# Patient Record
Sex: MALE | Race: WHITE | NOT HISPANIC OR LATINO | Employment: FULL TIME | URBAN - METROPOLITAN AREA
[De-identification: names, ages, dates, MRNs, and addresses within clinical notes are randomized per-mention and may not be internally consistent; named-entity substitution may affect disease eponyms.]

---
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
## 2020-01-06 RX ORDER — PEN NEEDLE, DIABETIC 32GX 5/32"
NEEDLE, DISPOSABLE MISCELLANEOUS
Qty: 100 EACH | Refills: 2 | Status: SHIPPED | OUTPATIENT
Start: 2020-01-06 | End: 2020-04-13

## 2020-01-17 ENCOUNTER — OFFICE VISIT (OUTPATIENT)
Dept: FAMILY MEDICINE CLINIC | Facility: CLINIC | Age: 20
End: 2020-01-17

## 2020-01-17 VITALS
OXYGEN SATURATION: 97 % | HEART RATE: 88 BPM | SYSTOLIC BLOOD PRESSURE: 102 MMHG | DIASTOLIC BLOOD PRESSURE: 66 MMHG | WEIGHT: 148 LBS | BODY MASS INDEX: 20.72 KG/M2 | HEIGHT: 71 IN

## 2020-01-17 DIAGNOSIS — E10.9 TYPE 1 DIABETES MELLITUS WITHOUT COMPLICATION (HCC): Primary | ICD-10-CM

## 2020-01-17 PROCEDURE — 99213 OFFICE O/P EST LOW 20 MIN: CPT | Performed by: FAMILY MEDICINE

## 2020-01-17 NOTE — PATIENT INSTRUCTIONS
Patient will be following with endocrinology for now.  When he is released from endocrinology I can manage his diabetes in the future.  I advised him to follow-up for routine health maintenance as scheduled.

## 2020-01-17 NOTE — PROGRESS NOTES
Subjective   Philippe Hartmann is a 19 y.o. male.     Chief Complaint   Patient presents with   • Diabetes     4 week follow up. States he saw ENDO in December and will see them again within the next month       Patient is here to follow-up on the new diagnosis of type 1 diabetes. He has been compliant with using the Toujeo 10 units at bedtime.  Patient averages about 1 to 2 units of NovoLog with meals.  He usually runs around 130 for blood sugar.  Overall the patient states that he is feeling well.  The patient will follow-up with [facility] endocrinology in the next few weeks. pt has not had any low sugar readings.        Review of Systems   Constitutional: Negative for activity change, chills, fatigue and fever.   HENT: Negative for hearing loss, swollen glands, tinnitus and trouble swallowing.    Eyes: Negative for pain and visual disturbance.   Respiratory: Negative for cough and shortness of breath.    Cardiovascular: Negative for chest pain, palpitations and leg swelling.   Gastrointestinal: Negative for diarrhea and nausea.   Endocrine: Negative for polydipsia and polyuria.   Genitourinary: Negative for difficulty urinating and urinary incontinence.   Musculoskeletal: Negative for arthralgias, gait problem and joint swelling.   Skin: Negative for rash.   Allergic/Immunologic: Negative for immunocompromised state.   Neurological: Negative for dizziness, light-headedness and headache.   Hematological: Negative for adenopathy. Does not bruise/bleed easily.   Psychiatric/Behavioral: Negative for dysphoric mood and sleep disturbance.       The following portions of the patient's history were reviewed and updated as appropriate: allergies, current medications, past family history, past medical history, past social history, past surgical history and problem list.    Past Medical History:   Diagnosis Date   • Acute pharyngitis    • Acute upper respiratory infection    • Allergic rhinitis    • Atopic dermatitis    • Diabetes  mellitus (CMS/HCC)    • Fever    • Impingement syndrome of left shoulder    • Shoulder disorder    • Sore throat    • Sore throat    • Viral bronchitis        Past Surgical History:   Procedure Laterality Date   • CYST REMOVAL     • CYST REMOVAL      lower left leg       Family History   Problem Relation Age of Onset   • No Known Problems Mother    • No Known Problems Father        Social History     Socioeconomic History   • Marital status: Single     Spouse name: Not on file   • Number of children: Not on file   • Years of education: Not on file   • Highest education level: High school graduate   Occupational History   • Occupation:      Employer: [employer]   Tobacco Use   • Smoking status: Never Smoker   • Smokeless tobacco: Never Used   Substance and Sexual Activity   • Alcohol use: Yes     Frequency: Monthly or less   • Drug use: Yes     Frequency: 0.5 times per week     Types: Marijuana   • Sexual activity: Never     Partners: Female         Current Outpatient Medications:   •  BD PEN NEEDLE KHADIJAH U/F 32G X 4 MM misc, USE FOUR TIMES DAILY, Disp: 100 each, Rfl: 2  •  Blood Glucose Monitoring Suppl (BLOOD GLUCOSE MONITOR SYSTEM) w/Device kit, Use to test blood sugar three times daily, Disp: 1 each, Rfl: 0  •  glucose blood test strip, Use to test blood sugar three times daily, Disp: 90 each, Rfl: 12  •  insulin aspart (NOVOLOG FLEXPEN) 100 UNIT/ML solution pen-injector sc pen, Inject 5-10 Units under the skin into the appropriate area as directed 3 (Three) Times a Day With Meals., Disp: 3 mL, Rfl: 1  •  Insulin Glargine, 2 Unit Dial, (TOUJEO MAX SOLOSTAR) 300 UNIT/ML solution pen-injector injection, Inject 10 units subcutaneously at bedtime, Disp: 3 mL, Rfl: 1  •  Lancets (FREESTYLE) lancets, Use to test blood sugar three times daily, Disp: 90 each, Rfl: 12  •  promethazine (PHENERGAN) 12.5 MG tablet, 1/2  To  1 tab every 4-6 hours as needed for nausea or vomiting, Disp: 30 tablet, Rfl:  "0    Objective     Vitals:    01/17/20 1406   BP: 102/66   Pulse: 88   SpO2: 97%   Weight: 67.1 kg (148 lb)   Height: 180.3 cm (71\")       Body mass index is 20.64 kg/m².    No components found for: 2D    Physical Exam   Constitutional: He is oriented to person, place, and time. He appears well-developed and well-nourished.   HENT:   Head: Normocephalic and atraumatic.   Eyes: Conjunctivae are normal.   Neck: Normal range of motion. Neck supple.   Cardiovascular: Normal rate, regular rhythm, normal heart sounds and intact distal pulses.   Pulmonary/Chest: Effort normal and breath sounds normal.   Abdominal: Soft. Bowel sounds are normal.   Musculoskeletal: Normal range of motion. He exhibits no edema.   Neurological: He is alert and oriented to person, place, and time.   Skin: Skin is warm and dry. Capillary refill takes less than 2 seconds. No rash noted.   Psychiatric: He has a normal mood and affect. His behavior is normal. Judgment and thought content normal.   Nursing note and vitals reviewed.      Procedures    Assessment/Plan   Philippe was seen today for diabetes.    Diagnoses and all orders for this visit:    Type 1 diabetes mellitus without complication (CMS/[facility])  -     insulin aspart (NOVOLOG FLEXPEN) 100 UNIT/ML solution pen-injector sc pen; Inject 5-10 Units under the skin into the appropriate area as directed 3 (Three) Times a Day With Meals.  -     Insulin Glargine, 2 Unit Dial, (TOUJEO MAX SOLOSTAR) 300 UNIT/ML solution pen-injector injection; Inject 10 units subcutaneously at bedtime        Patient Instructions   Patient will be following with endocrinology for now.  When he is released from endocrinology I can manage his diabetes in the future.  I advised him to follow-up for routine health maintenance as scheduled.           "

## 2020-04-12 DIAGNOSIS — E10.9 TYPE 1 DIABETES MELLITUS WITHOUT COMPLICATION (HCC): ICD-10-CM

## 2020-04-13 RX ORDER — INSULIN GLARGINE 300 U/ML
INJECTION, SOLUTION SUBCUTANEOUS
Qty: 3 ML | Refills: 1 | Status: SHIPPED | OUTPATIENT
Start: 2020-04-13 | End: 2022-09-09

## 2020-04-13 RX ORDER — PEN NEEDLE, DIABETIC 32GX 5/32"
NEEDLE, DISPOSABLE MISCELLANEOUS
Qty: 100 EACH | Refills: 2 | Status: SHIPPED | OUTPATIENT
Start: 2020-04-13 | End: 2020-08-12 | Stop reason: SDUPTHER

## 2020-04-13 NOTE — TELEPHONE ENCOUNTER
REFILL FOR DM MEDS BUT I BELIEVE PT HAS HAD ATLEAST ONE APPT WITH ENDO BEFORE THE PANDEMIC OCCURRED. DO YOU WANT TO REFUSE?

## 2020-08-12 RX ORDER — PEN NEEDLE, DIABETIC 32GX 5/32"
NEEDLE, DISPOSABLE MISCELLANEOUS
Qty: 200 EACH | Refills: 2 | Status: SHIPPED | OUTPATIENT
Start: 2020-08-12 | End: 2020-12-24 | Stop reason: SDUPTHER

## 2020-09-22 DIAGNOSIS — E10.9 TYPE 1 DIABETES MELLITUS WITHOUT COMPLICATION (HCC): ICD-10-CM

## 2020-09-22 RX ORDER — INSULIN ASPART 100 [IU]/ML
5-10 INJECTION, SOLUTION INTRAVENOUS; SUBCUTANEOUS
Qty: 3 ML | Refills: 1 | Status: SHIPPED | OUTPATIENT
Start: 2020-09-22 | End: 2022-12-27 | Stop reason: SDUPTHER

## 2020-10-29 ENCOUNTER — OFFICE VISIT (OUTPATIENT)
Dept: FAMILY MEDICINE CLINIC | Facility: CLINIC | Age: 20
End: 2020-10-29

## 2020-10-29 VITALS
SYSTOLIC BLOOD PRESSURE: 98 MMHG | OXYGEN SATURATION: 97 % | DIASTOLIC BLOOD PRESSURE: 64 MMHG | HEIGHT: 71 IN | TEMPERATURE: 97.7 F | BODY MASS INDEX: 20.72 KG/M2 | HEART RATE: 64 BPM | WEIGHT: 148 LBS

## 2020-10-29 DIAGNOSIS — E10.9 TYPE 1 DIABETES MELLITUS WITHOUT COMPLICATION (HCC): ICD-10-CM

## 2020-10-29 DIAGNOSIS — H65.01 NON-RECURRENT ACUTE SEROUS OTITIS MEDIA OF RIGHT EAR: Primary | ICD-10-CM

## 2020-10-29 LAB — HBA1C MFR BLD: 5.7 %

## 2020-10-29 PROCEDURE — 99213 OFFICE O/P EST LOW 20 MIN: CPT | Performed by: FAMILY MEDICINE

## 2020-10-29 PROCEDURE — 83036 HEMOGLOBIN GLYCOSYLATED A1C: CPT | Performed by: FAMILY MEDICINE

## 2020-10-29 NOTE — PROGRESS NOTES
Subjective   Philippe Hartmann is a 20 y.o. male.     Chief Complaint   Patient presents with   • Earache     right ear       Patient is here today with a new problem.  He started a little over a week ago with right ear pain and decreased hearing.  He denies any discharge from the ear.  He denies any injury or trauma.  Patient has had some allergy symptoms but nothing out of the normal.  He denies any fever or chills, sore throat or cough.  He states that slowly over the last week the pain has gone away and his hearing has improved.    Type 1 diabetes.  The patient states that he has not seen his endocrinologist for almost a year now.  He states that his blood sugars are well controlled on his current regimen.  He denies any significant lows.       Review of Systems   Constitutional: Negative for activity change, chills, fatigue and fever.   HENT: Positive for ear pain and hearing loss. Negative for swollen glands, tinnitus and trouble swallowing.    Eyes: Negative for pain and visual disturbance.   Respiratory: Negative for cough and shortness of breath.    Cardiovascular: Negative for chest pain, palpitations and leg swelling.   Gastrointestinal: Negative for diarrhea and nausea.   Endocrine: Negative for polydipsia and polyuria.   Genitourinary: Negative for difficulty urinating and urinary incontinence.   Musculoskeletal: Negative for arthralgias, gait problem and joint swelling.   Skin: Negative for rash.   Allergic/Immunologic: Negative for immunocompromised state.   Neurological: Negative for dizziness, light-headedness and headache.   Hematological: Negative for adenopathy. Does not bruise/bleed easily.   Psychiatric/Behavioral: Negative for dysphoric mood and sleep disturbance.       The following portions of the patient's history were reviewed and updated as appropriate: allergies, current medications, past family history, past medical history, past social history, past surgical history and problem  list.    Past Medical History:   Diagnosis Date   • Acute pharyngitis    • Acute upper respiratory infection    • Allergic rhinitis    • Atopic dermatitis    • Diabetes mellitus (CMS/HCC)    • Fever    • Impingement syndrome of left shoulder    • Shoulder disorder    • Sore throat    • Sore throat    • Viral bronchitis        Past Surgical History:   Procedure Laterality Date   • CYST REMOVAL     • CYST REMOVAL      lower left leg       Family History   Problem Relation Age of Onset   • No Known Problems Mother    • No Known Problems Father        Social History     Socioeconomic History   • Marital status: Single     Spouse name: Not on file   • Number of children: Not on file   • Years of education: Not on file   • Highest education level: High school graduate   Occupational History   • Occupation:      Employer: [employer]   Tobacco Use   • Smoking status: Never Smoker   • Smokeless tobacco: Never Used   Substance and Sexual Activity   • Alcohol use: Yes     Frequency: Monthly or less   • Drug use: Yes     Frequency: 0.5 times per week     Types: Marijuana   • Sexual activity: Never     Partners: Female         Current Outpatient Medications:   •  BD PEN NEEDLE KHADIJAH U/F 32G X 4 MM misc, USE FOUR TIMES DAILY, Disp: 200 each, Rfl: 2  •  Blood Glucose Monitoring Suppl (BLOOD GLUCOSE MONITOR SYSTEM) w/Device kit, Use to test blood sugar three times daily, Disp: 1 each, Rfl: 0  •  glucose blood test strip, Use to test blood sugar three times daily, Disp: 90 each, Rfl: 12  •  insulin aspart (NovoLOG FlexPen) 100 UNIT/ML solution pen-injector sc pen, Inject 5-10 Units under the skin into the appropriate area as directed 3 (Three) Times a Day With Meals., Disp: 3 mL, Rfl: 1  •  Insulin Pen Needle (BD Pen Needle Khadijah U/F) 32G X 4 MM misc, USE TO INJECT INSULIN FOUR TIMES DAILY/, Disp: 100 each, Rfl: 4  •  Lancets (FREESTYLE) lancets, Use to test blood sugar three times daily, Disp: 90 each, Rfl: 12  •   "TOUJEO MAX SOLOSTAR 300 UNIT/ML solution pen-injector injection, INJECT 10 UNITS UNDER THE SKIN EVERY NIGHT AT BEDTIME, Disp: 3 mL, Rfl: 1    Objective     Vitals:    10/29/20 1448   BP: 98/64   Pulse: 64   Temp: 97.7 °F (36.5 °C)   SpO2: 97%   Weight: 67.1 kg (148 lb)   Height: 180.3 cm (71\")       Body mass index is 20.64 kg/m².    No components found for: 2D    Physical Exam  Vitals signs and nursing note reviewed.   Constitutional:       Appearance: He is well-developed.   HENT:      Head: Normocephalic and atraumatic.      Right Ear: External ear normal.      Left Ear: Tympanic membrane, ear canal and external ear normal.      Ears:      Comments: Right TM was dull with some clear fluid middle ear.  Eyes:      Conjunctiva/sclera: Conjunctivae normal.   Neck:      Musculoskeletal: Normal range of motion and neck supple.   Cardiovascular:      Rate and Rhythm: Normal rate and regular rhythm.      Heart sounds: Normal heart sounds.   Pulmonary:      Effort: Pulmonary effort is normal.      Breath sounds: Normal breath sounds.   Abdominal:      General: Bowel sounds are normal.      Palpations: Abdomen is soft.   Musculoskeletal: Normal range of motion.      Right lower leg: No edema.      Left lower leg: No edema.   Skin:     General: Skin is warm and dry.      Capillary Refill: Capillary refill takes less than 2 seconds.      Findings: No rash.   Neurological:      General: No focal deficit present.      Mental Status: He is alert and oriented to person, place, and time.   Psychiatric:         Mood and Affect: Mood normal.         Behavior: Behavior normal.         Thought Content: Thought content normal.         Judgment: Judgment normal.         Procedures    Assessment/Plan   Diagnoses and all orders for this visit:    1. Non-recurrent acute serous otitis media of right ear (Primary)    2. Type 1 diabetes mellitus without complication (CMS/[facility])  -     POC Glycosylated Hemoglobin (Hb A1C)    Patient was advised " to use Claritin and Flonase nasal spray.  If his ear symptoms worsen or do not fully resolve he should return to the office.  Patient was advised to follow-up with his endocrinologist.    Patient Instructions   I recommended Claritin 10 mg daily and Flonase nasal spray for allergies.

## 2020-11-04 ENCOUNTER — TELEMEDICINE (OUTPATIENT)
Dept: FAMILY MEDICINE CLINIC | Facility: CLINIC | Age: 20
End: 2020-11-04

## 2020-11-04 DIAGNOSIS — J06.9 URI, ACUTE: ICD-10-CM

## 2020-11-04 DIAGNOSIS — R05.9 COUGH: Primary | ICD-10-CM

## 2020-11-04 PROCEDURE — 99213 OFFICE O/P EST LOW 20 MIN: CPT | Performed by: FAMILY MEDICINE

## 2020-11-04 RX ORDER — DEXTROMETHORPHAN HYDROBROMIDE AND PROMETHAZINE HYDROCHLORIDE 15; 6.25 MG/5ML; MG/5ML
5 SYRUP ORAL 4 TIMES DAILY PRN
Qty: 118 ML | Refills: 0 | Status: SHIPPED | OUTPATIENT
Start: 2020-11-04 | End: 2022-05-19 | Stop reason: SDUPTHER

## 2020-11-04 RX ORDER — INSULIN ADMIN. SUPPLIES
INSULIN PEN (EA) SUBCUTANEOUS
COMMUNITY
Start: 2020-08-25

## 2020-11-04 NOTE — PROGRESS NOTES
You have chosen to receive care through a telephone visit. Do you consent to use a telephone visit for your medical care today? YES     Subjective   Philippe Hartmann is a 20 y.o. male.     Chief Complaint   Patient presents with   • Sore Throat   • Headache   • Cough   • Fatigue   • URI   • Nasal Congestion   • Chills       Patient requested a video visit today during the coronavirus pandemic to discuss some new symptoms he is having.  He states that 3 days ago he started with a sore throat but then last night he suddenly became chilled, started having a runny nose, congestion, cough, nausea and a severe headache.  The headache is improved somewhat with antipyretics.  He does not have a thermometer so is unable to take his temperature.  However, he is feeling very fatigued as well.  So far his blood sugars have been okay.  The patient states 4 days ago he was out all day and was around friends who were not social distancing or wearing masks.  Nobody was exhibiting any symptoms at that time and he does not have any known COVID-19 contacts.  Currently the patient is living with 3 roommates but he does have his own bedroom.       Review of Systems   Constitutional: Positive for chills and fatigue. Negative for activity change and fever.   HENT: Positive for congestion, rhinorrhea and sore throat. Negative for hearing loss, swollen glands, tinnitus and trouble swallowing.    Eyes: Negative for pain and visual disturbance.   Respiratory: Positive for cough. Negative for shortness of breath.    Cardiovascular: Negative for chest pain, palpitations and leg swelling.   Gastrointestinal: Positive for nausea. Negative for diarrhea.   Endocrine: Negative for polydipsia and polyuria.   Genitourinary: Negative for difficulty urinating and urinary incontinence.   Musculoskeletal: Negative for arthralgias, gait problem and joint swelling.   Skin: Negative for rash.   Allergic/Immunologic: Negative for immunocompromised state.    Neurological: Positive for headache. Negative for dizziness and light-headedness.   Hematological: Negative for adenopathy. Does not bruise/bleed easily.   Psychiatric/Behavioral: Negative for dysphoric mood and sleep disturbance.       The following portions of the patient's history were reviewed and updated as appropriate: allergies, current medications, past family history, past medical history, past social history, past surgical history and problem list.    Past Medical History:   Diagnosis Date   • Acute pharyngitis    • Acute upper respiratory infection    • Allergic rhinitis    • Atopic dermatitis    • Diabetes mellitus (CMS/HCC)    • Fever    • Impingement syndrome of left shoulder    • Shoulder disorder    • Sore throat    • Sore throat    • Viral bronchitis        Past Surgical History:   Procedure Laterality Date   • CYST REMOVAL     • CYST REMOVAL      lower left leg       Family History   Problem Relation Age of Onset   • No Known Problems Mother    • No Known Problems Father        Social History     Socioeconomic History   • Marital status: Single     Spouse name: Not on file   • Number of children: Not on file   • Years of education: Not on file   • Highest education level: High school graduate   Occupational History   • Occupation:      Employer: [employer]   Tobacco Use   • Smoking status: Never Smoker   • Smokeless tobacco: Never Used   Substance and Sexual Activity   • Alcohol use: Yes     Frequency: Monthly or less   • Drug use: Yes     Frequency: 0.5 times per week     Types: Marijuana   • Sexual activity: Never     Partners: Female         Current Outpatient Medications:   •  BD PEN NEEDLE KHADIJAH U/F 32G X 4 MM misc, USE FOUR TIMES DAILY, Disp: 200 each, Rfl: 2  •  Blood Glucose Monitoring Suppl (BLOOD GLUCOSE MONITOR SYSTEM) w/Device kit, Use to test blood sugar three times daily, Disp: 1 each, Rfl: 0  •  glucose blood test strip, Use to test blood sugar three times daily, Disp:  90 each, Rfl: 12  •  insulin aspart (NovoLOG FlexPen) 100 UNIT/ML solution pen-injector sc pen, Inject 5-10 Units under the skin into the appropriate area as directed 3 (Three) Times a Day With Meals., Disp: 3 mL, Rfl: 1  •  Insulin Pen Needle (BD Pen Needle Martha U/F) 32G X 4 MM misc, USE TO INJECT INSULIN FOUR TIMES DAILY/, Disp: 100 each, Rfl: 4  •  Lancets (FREESTYLE) lancets, Use to test blood sugar three times daily, Disp: 90 each, Rfl: 12  •  NovoPen Echo device, , Disp: , Rfl:   •  TOUJEO MAX SOLOSTAR 300 UNIT/ML solution pen-injector injection, INJECT 10 UNITS UNDER THE SKIN EVERY NIGHT AT BEDTIME, Disp: 3 mL, Rfl: 1  •  promethazine-dextromethorphan (PROMETHAZINE-DM) 6.25-15 MG/5ML syrup, Take 5 mL by mouth 4 (Four) Times a Day As Needed for Cough., Disp: 118 mL, Rfl: 0    Objective     There were no vitals filed for this visit.    There is no height or weight on file to calculate BMI.    No components found for: 2D    Physical Exam  Constitutional:       Appearance: Normal appearance. He is well-developed.   HENT:      Head: Normocephalic and atraumatic.   Eyes:      General: No scleral icterus.     Conjunctiva/sclera: Conjunctivae normal.   Pulmonary:      Effort: Pulmonary effort is normal.   Neurological:      General: No focal deficit present.      Mental Status: He is alert and oriented to person, place, and time.   Psychiatric:         Mood and Affect: Mood normal.         Behavior: Behavior normal.         Thought Content: Thought content normal.         Judgment: Judgment normal.         Procedures    Assessment/Plan   Diagnoses and all orders for this visit:    1. Cough (Primary)  -     COVID-19,[facility] ROUTINE, NP/OP SWAB IN TRANSPORT MEDIA OR ESWAB 72 HR TAT - Swab, Oropharynx; Future  -     promethazine-dextromethorphan (PROMETHAZINE-DM) 6.25-15 MG/5ML syrup; Take 5 mL by mouth 4 (Four) Times a Day As Needed for Cough.  Dispense: 118 mL; Refill: 0  -     QUESTIONNAIRE SERIES    2. URI, acute  -      COVID-19,[facility] ROUTINE, NP/OP SWAB IN TRANSPORT MEDIA OR ESWAB 72 HR TAT - Swab, Oropharynx; Future  -     QUESTIONNAIRE SERIES    The patient was advised to self quarantine and follow the guidelines of social distancing, wearing a mask, and good hand hygiene.  He was advised to closely monitor his symptoms and his blood sugars and report back to me if he is feeling significantly worse or his blood sugars are consistently greater than 400.  Continue to hydrate well with water, rest, and follow good nutrition.    This was an audio and video enabled telemedicine encounter lasting 15 minutes, all of which was spent in direct audio and video contact with the patient.  There are no Patient Instructions on file for this visit.

## 2020-11-05 DIAGNOSIS — E10.9 TYPE 1 DIABETES MELLITUS WITHOUT COMPLICATION (HCC): ICD-10-CM

## 2020-11-05 RX ORDER — BLOOD-GLUCOSE METER
EACH MISCELLANEOUS
Qty: 1 KIT | Refills: 0 | Status: SHIPPED | OUTPATIENT
Start: 2020-11-05

## 2020-12-05 DIAGNOSIS — E10.9 TYPE 1 DIABETES MELLITUS WITHOUT COMPLICATION (HCC): ICD-10-CM

## 2020-12-07 RX ORDER — BLOOD SUGAR DIAGNOSTIC
STRIP MISCELLANEOUS
Qty: 100 EACH | Refills: 12 | Status: SHIPPED | OUTPATIENT
Start: 2020-12-07

## 2020-12-27 RX ORDER — PEN NEEDLE, DIABETIC 32GX 5/32"
NEEDLE, DISPOSABLE MISCELLANEOUS
Qty: 200 EACH | Refills: 2 | Status: SHIPPED | OUTPATIENT
Start: 2020-12-27

## 2021-04-16 ENCOUNTER — BULK ORDERING (OUTPATIENT)
Dept: CASE MANAGEMENT | Facility: OTHER | Age: 21
End: 2021-04-16

## 2021-04-16 DIAGNOSIS — Z23 IMMUNIZATION DUE: ICD-10-CM

## 2021-08-23 RX ORDER — PEN NEEDLE, DIABETIC 32GX 5/32"
NEEDLE, DISPOSABLE MISCELLANEOUS
Qty: 200 EACH | Refills: 2 | OUTPATIENT
Start: 2021-08-23

## 2021-08-23 RX ORDER — PEN NEEDLE, DIABETIC 32GX 5/32"
NEEDLE, DISPOSABLE MISCELLANEOUS
Qty: 200 EACH | OUTPATIENT
Start: 2021-08-23

## 2021-10-11 RX ORDER — PEN NEEDLE, DIABETIC 32GX 5/32"
NEEDLE, DISPOSABLE MISCELLANEOUS
Qty: 200 EACH | Refills: 0 | Status: SHIPPED | OUTPATIENT
Start: 2021-10-11 | End: 2021-12-30 | Stop reason: SDUPTHER

## 2021-12-30 RX ORDER — PEN NEEDLE, DIABETIC 32GX 5/32"
NEEDLE, DISPOSABLE MISCELLANEOUS
Qty: 200 EACH | Refills: 0 | Status: SHIPPED | OUTPATIENT
Start: 2021-12-30 | End: 2022-03-14

## 2022-03-14 RX ORDER — PEN NEEDLE, DIABETIC 32GX 5/32"
NEEDLE, DISPOSABLE MISCELLANEOUS
Qty: 200 EACH | Refills: 0 | Status: SHIPPED | OUTPATIENT
Start: 2022-03-14 | End: 2022-04-06

## 2022-04-06 RX ORDER — PEN NEEDLE, DIABETIC 32GX 5/32"
NEEDLE, DISPOSABLE MISCELLANEOUS
Qty: 200 EACH | Refills: 0 | Status: SHIPPED | OUTPATIENT
Start: 2022-04-06 | End: 2022-04-27

## 2022-04-27 RX ORDER — PEN NEEDLE, DIABETIC 32GX 5/32"
NEEDLE, DISPOSABLE MISCELLANEOUS
Qty: 200 EACH | Refills: 0 | Status: SHIPPED | OUTPATIENT
Start: 2022-04-27

## 2022-05-19 ENCOUNTER — OFFICE VISIT (OUTPATIENT)
Dept: FAMILY MEDICINE CLINIC | Facility: CLINIC | Age: 22
End: 2022-05-19

## 2022-05-19 VITALS
OXYGEN SATURATION: 98 % | HEART RATE: 90 BPM | SYSTOLIC BLOOD PRESSURE: 110 MMHG | HEIGHT: 71 IN | DIASTOLIC BLOOD PRESSURE: 70 MMHG | BODY MASS INDEX: 23.24 KG/M2 | WEIGHT: 166 LBS | TEMPERATURE: 98.9 F

## 2022-05-19 DIAGNOSIS — U07.1 UPPER RESPIRATORY TRACT INFECTION DUE TO COVID-19 VIRUS: Primary | ICD-10-CM

## 2022-05-19 DIAGNOSIS — E10.9 TYPE 1 DIABETES MELLITUS WITHOUT COMPLICATION: ICD-10-CM

## 2022-05-19 DIAGNOSIS — J06.9 UPPER RESPIRATORY TRACT INFECTION DUE TO COVID-19 VIRUS: Primary | ICD-10-CM

## 2022-05-19 DIAGNOSIS — R52 BODY ACHES: ICD-10-CM

## 2022-05-19 DIAGNOSIS — R05.9 COUGH: ICD-10-CM

## 2022-05-19 LAB
EXPIRATION DATE: ABNORMAL
EXPIRATION DATE: NORMAL
FLUAV AG NPH QL: NEGATIVE
FLUBV AG NPH QL: NEGATIVE
INTERNAL CONTROL: ABNORMAL
INTERNAL CONTROL: NORMAL
Lab: ABNORMAL
Lab: NORMAL
SARS-COV-2 AG UPPER RESP QL IA.RAPID: DETECTED

## 2022-05-19 PROCEDURE — 87804 INFLUENZA ASSAY W/OPTIC: CPT | Performed by: FAMILY MEDICINE

## 2022-05-19 PROCEDURE — 99214 OFFICE O/P EST MOD 30 MIN: CPT | Performed by: FAMILY MEDICINE

## 2022-05-19 PROCEDURE — 87426 SARSCOV CORONAVIRUS AG IA: CPT | Performed by: FAMILY MEDICINE

## 2022-05-19 RX ORDER — BENZONATATE 200 MG/1
200 CAPSULE ORAL 3 TIMES DAILY PRN
Qty: 30 CAPSULE | Refills: 0 | Status: SHIPPED | OUTPATIENT
Start: 2022-05-19 | End: 2022-09-09

## 2022-05-19 RX ORDER — PROCHLORPERAZINE 25 MG/1
SUPPOSITORY RECTAL
COMMUNITY
Start: 2022-03-13

## 2022-05-19 RX ORDER — DEXTROMETHORPHAN HYDROBROMIDE AND PROMETHAZINE HYDROCHLORIDE 15; 6.25 MG/5ML; MG/5ML
5 SYRUP ORAL 4 TIMES DAILY PRN
Qty: 118 ML | Refills: 0 | Status: SHIPPED | OUTPATIENT
Start: 2022-05-19 | End: 2022-09-09

## 2022-05-19 NOTE — PROGRESS NOTES
"Chief Complaint  Covid-19 Home Monitoring Phone Call (Chills headache)    Subjective          Philippe Hartmann presents to [facility]  Patient is here today to discuss some new symptoms he is having.  He started 3 days ago with cough and congestion.  Then yesterday he began having headache, body aches, fatigue, and chills.  He is a well-controlled type I diabetic and states that his blood sugars have been between 140 and 170.  He does not have an appetite and he is monitoring his blood sugars closely.  Patient has not had the COVID-vaccine.  He has been in close contact with 2 separate individuals who have been diagnosed with COVID-19 in the past 5 days.  Currently the patient is not working.  He just quit his job last week.  Patient states that last night he slept really poorly.      Objective   Vital Signs:  /70   Pulse 90   Temp 98.9 °F (37.2 °C)   Ht 180.3 cm (71\")   Wt 75.3 kg (166 lb)   SpO2 98%   BMI 23.15 kg/m²   BMI is within normal parameters. No other follow-up for BMI required.      Physical Exam  Vitals and nursing note reviewed.   Constitutional:       Appearance: Normal appearance. He is well-developed and normal weight.   HENT:      Head: Normocephalic and atraumatic.      Right Ear: Tympanic membrane, ear canal and external ear normal.      Left Ear: Tympanic membrane, ear canal and external ear normal.      Nose: Rhinorrhea present.      Mouth/Throat:      Pharynx: Posterior oropharyngeal erythema present. No oropharyngeal exudate.   Eyes:      General: No scleral icterus.     Conjunctiva/sclera: Conjunctivae normal.   Cardiovascular:      Rate and Rhythm: Normal rate and regular rhythm.      Heart sounds: Normal heart sounds.   Pulmonary:      Effort: Pulmonary effort is normal.      Breath sounds: Normal breath sounds.   Abdominal:      General: Bowel sounds are normal.      Palpations: Abdomen is soft.   Musculoskeletal:         General: Normal range of " motion.      Cervical back: Normal range of motion and neck supple. No rigidity or tenderness.      Right lower leg: No edema.      Left lower leg: No edema.   Lymphadenopathy:      Cervical: Cervical adenopathy present.   Skin:     General: Skin is warm and dry.      Capillary Refill: Capillary refill takes less than 2 seconds.      Findings: No rash.   Neurological:      Mental Status: He is alert and oriented to person, place, and time.   Psychiatric:         Mood and Affect: Mood normal.         Behavior: Behavior normal.         Thought Content: Thought content normal.         Judgment: Judgment normal.        Result Review :   The following data was reviewed by: Glenna Parsons DO on 05/19/2022:      TSH    TSH 9/10/21   TSH 0.844           Data reviewed: Rapid COVID test is positive.  Influenza A and influenza B test are negative in office          Assessment and Plan    Diagnoses and all orders for this visit:    1. Upper respiratory tract infection due to COVID-19 virus (Primary)    2. Body aches  -     POC Influenza A / B  -     POCT SARS-CoV-2 Antigen MONICA    3. Cough  -     benzonatate (TESSALON) 200 MG capsule; Take 1 capsule by mouth 3 (Three) Times a Day As Needed for Cough.  Dispense: 30 capsule; Refill: 0  -     promethazine-dextromethorphan (PROMETHAZINE-DM) 6.25-15 MG/5ML syrup; Take 5 mL by mouth 4 (Four) Times a Day As Needed for Cough.  Dispense: 118 mL; Refill: 0    4. Type 1 diabetes mellitus without complication (HCC)    Patient seen today for COVID URI.  I offered the patient Paxlovid and discussed that it was not FDA approved but had been approved by [employer].  Discussed potential benefits and side effects.  After informed decision making the patient refused Paxlovid.  He elected to treat the symptoms with above medications, rest, and increase fluids.  Discussed that due to patient having type 1 diabetes he is at higher risk of need for hospitalization.  I encouraged him to stay at his parents  house so that he has 24-hour care but to remain masked around anyone in the home.  The patient agrees with plan.  He will monitor his symptoms closely and follow-up or seek more urgent medical care as needed.      5/20/2022 at 3:50 PM.  Courtesy call to the patient today.  Patient feels slightly better in spite of a worsening sore throat.  He slept better last night.  He did have higher blood sugars in the 200-250 range last night but today they have improved and are around 150-170.  Patient without any new symptoms.  He is staying at his parents home but following the masking guidelines.  Advised patient to contact me or seek more urgent medical care if his symptoms are worsening..     I spent 31 minutes caring for Philippe on this date of service. This time includes time spent by me in the following activities:performing a medically appropriate examination and/or evaluation , counseling and educating the patient/family/caregiver, ordering medications, tests, or procedures and documenting information in the medical record  Follow Up   Return if symptoms worsen or fail to improve.  Patient was given instructions and counseling regarding his condition or for health maintenance advice. Please see specific information pulled into the AVS if appropriate.

## 2022-09-09 ENCOUNTER — OFFICE VISIT (OUTPATIENT)
Dept: FAMILY MEDICINE CLINIC | Facility: CLINIC | Age: 22
End: 2022-09-09

## 2022-09-09 VITALS
HEIGHT: 71 IN | SYSTOLIC BLOOD PRESSURE: 100 MMHG | DIASTOLIC BLOOD PRESSURE: 62 MMHG | HEART RATE: 74 BPM | OXYGEN SATURATION: 99 % | BODY MASS INDEX: 21.98 KG/M2 | WEIGHT: 157 LBS | TEMPERATURE: 97.5 F

## 2022-09-09 DIAGNOSIS — B36.9 DERMATOMYCOSIS: Primary | ICD-10-CM

## 2022-09-09 PROCEDURE — 99213 OFFICE O/P EST LOW 20 MIN: CPT | Performed by: FAMILY MEDICINE

## 2022-09-09 RX ORDER — NYSTATIN 100000 U/G
1 OINTMENT TOPICAL 2 TIMES DAILY
Qty: 30 G | Refills: 0 | Status: SHIPPED | OUTPATIENT
Start: 2022-09-09

## 2022-09-09 NOTE — PROGRESS NOTES
"Chief Complaint  Rash (On right foot and leg )    Subjective        Philippe Hartmann presents to [facility]  Patient is here today for new problem.  He started noticing a rash on his right foot a couple of weeks ago.  It is itchy.  It appears to get more itchy at night.  He has to wear boots at work and his feet sweat a lot.  Patient denies any pain      Objective   Vital Signs:  /62   Pulse 74   Temp 97.5 °F (36.4 °C)   Ht 180.3 cm (71\")   Wt 71.2 kg (157 lb)   SpO2 99%   BMI 21.90 kg/m²   Estimated body mass index is 21.9 kg/m² as calculated from the following:    Height as of this encounter: 180.3 cm (71\").    Weight as of this encounter: 71.2 kg (157 lb).    BMI is within normal parameters. No other follow-up for BMI required.      Physical Exam  Vitals and nursing note reviewed.   Constitutional:       Appearance: Normal appearance. He is well-developed and normal weight.   HENT:      Head: Normocephalic and atraumatic.   Eyes:      Conjunctiva/sclera: Conjunctivae normal.   Pulmonary:      Effort: Pulmonary effort is normal.   Musculoskeletal:         General: Normal range of motion.      Cervical back: Normal range of motion and neck supple.   Skin:     General: Skin is warm and dry.      Capillary Refill: Capillary refill takes less than 2 seconds.      Findings: Rash (2 areas of erythematous well demarcated patches on the dorsum of the right foot.) present.   Neurological:      Mental Status: He is alert and oriented to person, place, and time.   Psychiatric:         Mood and Affect: Mood normal.         Behavior: Behavior normal.         Thought Content: Thought content normal.         Judgment: Judgment normal.        Result Review :                Assessment and Plan   Diagnoses and all orders for this visit:    1. Dermatomycosis (Primary)  -     nystatin (MYCOSTATIN) 586901 UNIT/GM ointment; Apply 1 application topically to the appropriate area as directed 2 (Two) " Times a Day.  Dispense: 30 g; Refill: 0    Patient is here today with a new problem of dermatomycosis on the right foot.  Nystatin ointment was prescribed.  Patient was advised that if his symptoms do not improve he should follow-up.  He was advised to change his socks frequently throughout his day and wear supportive but breathable shoes to prevent return of fungus..         Follow Up {Instructions Charge Capture  Follow-up Communications :23}  Return if symptoms worsen or fail to improve.  Patient was given instructions and counseling regarding his condition or for health maintenance advice. Please see specific information pulled into the AVS if appropriate.

## 2022-12-27 ENCOUNTER — OFFICE VISIT (OUTPATIENT)
Dept: FAMILY MEDICINE CLINIC | Facility: CLINIC | Age: 22
End: 2022-12-27

## 2022-12-27 VITALS
HEIGHT: 71 IN | OXYGEN SATURATION: 98 % | DIASTOLIC BLOOD PRESSURE: 70 MMHG | HEART RATE: 98 BPM | WEIGHT: 167 LBS | TEMPERATURE: 98.2 F | SYSTOLIC BLOOD PRESSURE: 110 MMHG | BODY MASS INDEX: 23.38 KG/M2

## 2022-12-27 DIAGNOSIS — E10.9 TYPE 1 DIABETES MELLITUS WITHOUT COMPLICATION: Primary | ICD-10-CM

## 2022-12-27 DIAGNOSIS — R20.0 NUMBNESS OF TOES: ICD-10-CM

## 2022-12-27 DIAGNOSIS — R07.9 CHEST PAIN, UNSPECIFIED TYPE: ICD-10-CM

## 2022-12-27 DIAGNOSIS — Z79.899 ENCOUNTER FOR LONG-TERM (CURRENT) USE OF MEDICATIONS: ICD-10-CM

## 2022-12-27 LAB
EXPIRATION DATE: ABNORMAL
HBA1C MFR BLD: 7.1 %
Lab: ABNORMAL

## 2022-12-27 PROCEDURE — 99214 OFFICE O/P EST MOD 30 MIN: CPT | Performed by: FAMILY MEDICINE

## 2022-12-27 PROCEDURE — 93000 ELECTROCARDIOGRAM COMPLETE: CPT | Performed by: FAMILY MEDICINE

## 2022-12-27 PROCEDURE — 83036 HEMOGLOBIN GLYCOSYLATED A1C: CPT | Performed by: FAMILY MEDICINE

## 2022-12-27 RX ORDER — PROCHLORPERAZINE 25 MG/1
1 SUPPOSITORY RECTAL TAKE AS DIRECTED
Qty: 1 EACH | Refills: 0 | Status: SHIPPED | OUTPATIENT
Start: 2022-12-27

## 2022-12-27 RX ORDER — FAMOTIDINE 20 MG/1
20 TABLET, FILM COATED ORAL 2 TIMES DAILY
Qty: 60 TABLET | Refills: 0 | Status: SHIPPED | OUTPATIENT
Start: 2022-12-27 | End: 2023-01-18

## 2022-12-27 RX ORDER — PROCHLORPERAZINE 25 MG/1
1 SUPPOSITORY RECTAL CONTINUOUS
Qty: 1 EACH | Refills: 2 | Status: SHIPPED | OUTPATIENT
Start: 2022-12-27

## 2022-12-27 RX ORDER — INSULIN ASPART 100 [IU]/ML
5-10 INJECTION, SOLUTION INTRAVENOUS; SUBCUTANEOUS
Qty: 3 ML | Refills: 1 | Status: SHIPPED | OUTPATIENT
Start: 2022-12-27 | End: 2022-12-29 | Stop reason: SDUPTHER

## 2022-12-27 NOTE — PROGRESS NOTES
"Chief Complaint  Med Refill, Diabetes, and feet numb     Subjective        Philippe Hartmann presents to [facility]  History of Present Illness  Patient is here today for monitoring of his type 1 diabetes.  Typically he sees the endocrinologist but he is unable to get into see them for a while and he needs refills.  Patient uses insulin aspart 2 to 10 units with each meal.  He monitors his blood sugars using the Dexcom system which works well for him.  His blood sugars have been a little higher in the last couple of months due to inconsistency of using his insulin and inability to afford the Dexcom system with his insurance.    Patient is also here for new problem of right great toe numbness intermittently.  This has been going on for 2 to 3 weeks.  It does not occur every day and is not associated with any back pain that the patient knows of. Patient does have a physical job and gets back pain on occasion but patient has not noticed the toe numbness at the same time. Numbness does not occur in his other foot or other toes or anywhere else.  Patient denies any discoloration or swelling.    The patient is also here for new problem of substernal chest pain for the past 2 days.  He has noticed some heartburn as well.  He denies any diaphoresis or dyspnea.  Patient denies any palpitations.  The patient states that the pain has been constant and feels more like a pressure and is worse when he takes a deep inspiration.  He feels that it may be related to him being anxious about his diabetes.  No N/V.        Objective   Vital Signs:  /70   Pulse 98   Temp 98.2 °F (36.8 °C)   Ht 180.3 cm (71\")   Wt 75.8 kg (167 lb)   SpO2 98%   BMI 23.29 kg/m²   Estimated body mass index is 23.29 kg/m² as calculated from the following:    Height as of this encounter: 180.3 cm (71\").    Weight as of this encounter: 75.8 kg (167 lb).    BMI is within normal parameters. No other follow-up for BMI " required.      Physical Exam  Vitals and nursing note reviewed.   Constitutional:       Appearance: Normal appearance. He is well-developed and normal weight.   HENT:      Head: Normocephalic and atraumatic.   Eyes:      Conjunctiva/sclera: Conjunctivae normal.   Cardiovascular:      Rate and Rhythm: Normal rate and regular rhythm.      Pulses: Normal pulses.      Heart sounds: Normal heart sounds.   Pulmonary:      Effort: Pulmonary effort is normal.      Breath sounds: Normal breath sounds.   Musculoskeletal:         General: No deformity. Normal range of motion.      Cervical back: Normal range of motion and neck supple.      Right lower leg: No edema.      Left lower leg: No edema.   Skin:     General: Skin is warm and dry.      Capillary Refill: Capillary refill takes less than 2 seconds.      Findings: No bruising, erythema or rash.   Neurological:      Mental Status: He is alert and oriented to person, place, and time.   Psychiatric:         Mood and Affect: Mood normal.         Behavior: Behavior normal.         Thought Content: Thought content normal.         Judgment: Judgment normal.        Result Review :           ECG 12 Lead    Date/Time: 12/27/2022 2:25 PM  Performed by: Glenna Parsons DO  Authorized by: Glenna Parsons DO   Previous ECG: no previous ECG available  Rhythm: sinus rhythm  Rate: normal  Conduction: conduction normal  ST Segments: ST segments normal  T Waves: T waves normal  QRS axis: normal    Clinical impression: normal ECG              Assessment and Plan   Diagnoses and all orders for this visit:    1. Type 1 diabetes mellitus without complication (HCC) (Primary)  -     POC Glycosylated Hemoglobin (Hb A1C)  -     Continuous Blood Gluc Transmit (Dexcom G6 Transmitter) misc; 1 Units Take As Directed.  Dispense: 1 each; Refill: 0  -     Continuous Blood Gluc  (Dexcom G6 ) device; 1 Units Continuous.  Dispense: 1 each; Refill: 2  -     insulin aspart (NovoLOG FlexPen)  100 UNIT/ML solution pen-injector sc pen; Inject 5-10 Units under the skin into the appropriate area as directed 3 (Three) Times a Day With Meals.  Dispense: 3 mL; Refill: 1  -     Comprehensive Metabolic Panel    2. Chest pain, unspecified type  -     ECG 12 Lead  -     famotidine (PEPCID) 20 MG tablet; Take 1 tablet by mouth 2 (Two) Times a Day.  Dispense: 60 tablet; Refill: 0    3. Numbness of toes  Comments:  Right Great Toe only    4. Encounter for long-term (current) use of medications  -     Comprehensive Metabolic Panel  -     CBC & Differential  -     TSH    Patient is here today for chronic stable type 1 diabetes.  Refills provided. Surveillance labs were obtained today and any medication changes will be made based on lab results and will be called to the patient later this week.    Patient is also here for new problem of chest pain.  I feel that his chest pain is likely related to heartburn which is made worse by stress.  I have reassured the patient that his diabetes is well controlled.  I have advised him to start famotidine as directed.  He should reduce the dose when his symptoms allow.  He may eventually discontinue use as his stress improves and is eating habits improve.  If symptoms persist patient was advised to follow-up.    Patient is also here for new problem of right great toe intermittent numbness.  The patient's symptoms sound very atypical for diabetic neuropathy because it only occurs in his right great toe and in no other toes or in the left foot.  I feel that it is a pinched nerve likely around the ankle or foot.  I have advised the patient to keep a diary of when the symptoms occur, his foot wear at the time the symptoms occur, how long the symptoms last and what helps to alleviate the numbness.  If his symptoms persist or if his symptoms worsen he should follow-up.       Follow Up   Return for Routine Follow Up.  Patient was given instructions and counseling regarding his condition or  for health maintenance advice. Please see specific information pulled into the AVS if appropriate.

## 2022-12-28 DIAGNOSIS — D72.829 LEUKOCYTOSIS, UNSPECIFIED TYPE: Primary | ICD-10-CM

## 2022-12-28 DIAGNOSIS — R79.89 ELEVATED LIVER FUNCTION TESTS: ICD-10-CM

## 2022-12-28 LAB
ALBUMIN SERPL-MCNC: 4.3 G/DL (ref 4.1–5.2)
ALBUMIN/GLOB SERPL: 1.9 {RATIO} (ref 1.2–2.2)
ALP SERPL-CCNC: 320 IU/L (ref 44–121)
ALT SERPL-CCNC: 148 IU/L (ref 0–44)
AST SERPL-CCNC: 125 IU/L (ref 0–40)
BASOPHILS # BLD AUTO: 0.2 X10E3/UL (ref 0–0.2)
BASOPHILS NFR BLD AUTO: 2 %
BILIRUB SERPL-MCNC: 0.6 MG/DL (ref 0–1.2)
BUN SERPL-MCNC: 14 MG/DL (ref 6–20)
BUN/CREAT SERPL: 12 (ref 9–20)
CALCIUM SERPL-MCNC: 9.2 MG/DL (ref 8.7–10.2)
CHLORIDE SERPL-SCNC: 103 MMOL/L (ref 96–106)
CO2 SERPL-SCNC: 24 MMOL/L (ref 20–29)
CREAT SERPL-MCNC: 1.18 MG/DL (ref 0.76–1.27)
EGFRCR SERPLBLD CKD-EPI 2021: 89 ML/MIN/1.73
EOSINOPHIL # BLD AUTO: 0 X10E3/UL (ref 0–0.4)
EOSINOPHIL NFR BLD AUTO: 0 %
ERYTHROCYTE [DISTWIDTH] IN BLOOD BY AUTOMATED COUNT: 12.4 % (ref 11.6–15.4)
GLOBULIN SER CALC-MCNC: 2.3 G/DL (ref 1.5–4.5)
GLUCOSE SERPL-MCNC: 106 MG/DL (ref 70–99)
HCT VFR BLD AUTO: 40.9 % (ref 37.5–51)
HGB BLD-MCNC: 14.2 G/DL (ref 13–17.7)
IMM GRANULOCYTES # BLD AUTO: 0 X10E3/UL (ref 0–0.1)
IMM GRANULOCYTES NFR BLD AUTO: 0 %
LYMPHOCYTES # BLD AUTO: 7.1 X10E3/UL (ref 0.7–3.1)
LYMPHOCYTES NFR BLD AUTO: 62 %
MCH RBC QN AUTO: 30.4 PG (ref 26.6–33)
MCHC RBC AUTO-ENTMCNC: 34.7 G/DL (ref 31.5–35.7)
MCV RBC AUTO: 88 FL (ref 79–97)
MONOCYTES # BLD AUTO: 2.7 X10E3/UL (ref 0.1–0.9)
MONOCYTES NFR BLD AUTO: 23 %
MORPHOLOGY BLD-IMP: ABNORMAL
NEUTROPHILS # BLD AUTO: 1.5 X10E3/UL (ref 1.4–7)
NEUTROPHILS NFR BLD AUTO: 13 %
PLATELET # BLD AUTO: 141 X10E3/UL (ref 150–450)
POTASSIUM SERPL-SCNC: 4.3 MMOL/L (ref 3.5–5.2)
PROT SERPL-MCNC: 6.6 G/DL (ref 6–8.5)
RBC # BLD AUTO: 4.67 X10E6/UL (ref 4.14–5.8)
SODIUM SERPL-SCNC: 139 MMOL/L (ref 134–144)
TSH SERPL DL<=0.005 MIU/L-ACNC: 1.47 UIU/ML (ref 0.45–4.5)
WBC # BLD AUTO: 11.6 X10E3/UL (ref 3.4–10.8)

## 2022-12-29 ENCOUNTER — PATIENT MESSAGE (OUTPATIENT)
Dept: FAMILY MEDICINE CLINIC | Facility: CLINIC | Age: 22
End: 2022-12-29

## 2022-12-29 ENCOUNTER — TELEPHONE (OUTPATIENT)
Dept: FAMILY MEDICINE CLINIC | Facility: CLINIC | Age: 22
End: 2022-12-29

## 2022-12-29 DIAGNOSIS — E10.9 TYPE 1 DIABETES MELLITUS WITHOUT COMPLICATION: ICD-10-CM

## 2022-12-29 LAB
AMYLASE SERPL-CCNC: 40 U/L (ref 31–110)
HAV IGM SERPL QL IA: NEGATIVE
HBV CORE IGM SERPL QL IA: NEGATIVE
HBV SURFACE AG SERPL QL IA: NEGATIVE
HCV AB S/CO SERPL IA: 0.1 S/CO RATIO (ref 0–0.9)
HCV AB SERPL QL IA: NORMAL
LIPASE SERPL-CCNC: 16 U/L (ref 13–78)
WRITTEN AUTHORIZATION: NORMAL

## 2022-12-29 RX ORDER — INSULIN ASPART 100 [IU]/ML
5-10 INJECTION, SOLUTION INTRAVENOUS; SUBCUTANEOUS
Qty: 3 ML | Refills: 1 | Status: SHIPPED | OUTPATIENT
Start: 2022-12-29

## 2022-12-29 NOTE — TELEPHONE ENCOUNTER
[facility] sent a fax that stated pts insurance covers HUMALOG, instead fo insulin aspart.  Please resend as brand

## 2022-12-29 NOTE — TELEPHONE ENCOUNTER
From: Philippe Hartmann  To: Glnena Parsons  Sent: 12/29/2022 11:47 AM EST  Subject: Supplements     Can I still take protein powder? I take whey protein in a shake every day. Could I eventually go back to taking preworkout minus the creatine?

## 2022-12-30 NOTE — TELEPHONE ENCOUNTER
From: Philippe Hartmann  To: Glenna Parsons  Sent: 12/29/2022 9:14 PM EST  Subject: Insulin sensitivity     Hi! Now that I have the glucose monitor it looks like my body is barely responding to insulin. I had a very small portion of rice with my dinner tonight and I took 5 units for it. My blood sugar immediately started rising and I took another 5 because it has been so high. It jumped from 106 to 250 in 20ish minutes and I took an additionally 7 units and it doesn’t seems to be doing anything. Could this sudden change in insulin sensitivity have to do with my liver?

## 2023-01-02 ENCOUNTER — PATIENT MESSAGE (OUTPATIENT)
Dept: FAMILY MEDICINE CLINIC | Facility: CLINIC | Age: 23
End: 2023-01-02
Payer: COMMERCIAL

## 2023-01-03 NOTE — TELEPHONE ENCOUNTER
From: Philippe Hartmann  To: Glenna Parsons  Sent: 1/2/2023 1:51 AM EST  Subject: Sick    Hey, I tested positive for mono on Friday after my symptoms worsened and I went to urgent care. Currently my lymph nodes are extremely swollen and hurt incredibly bad along with my throat and all of the prior symptoms. The doctor said just to rest and wait. Is there anything at all that can ease up the throat and lymph node pain? They’ve been so swollen it has been hard to talk and breath.

## 2023-01-10 ENCOUNTER — PATIENT MESSAGE (OUTPATIENT)
Dept: FAMILY MEDICINE CLINIC | Facility: CLINIC | Age: 23
End: 2023-01-10
Payer: COMMERCIAL

## 2023-01-10 DIAGNOSIS — R11.0 NAUSEA: ICD-10-CM

## 2023-01-10 DIAGNOSIS — R79.89 ELEVATED LIVER FUNCTION TESTS: Primary | ICD-10-CM

## 2023-01-10 NOTE — TELEPHONE ENCOUNTER
From: Philippe Hartmann  To: Glenna Parsons  Sent: 1/10/2023 10:22 AM EST  Subject: Results    Good morning! Would it be possible for me to lightly start taking either half doses of preworkout or protein again?

## 2023-01-18 DIAGNOSIS — R07.9 CHEST PAIN, UNSPECIFIED TYPE: ICD-10-CM

## 2023-01-18 RX ORDER — FAMOTIDINE 20 MG/1
TABLET, FILM COATED ORAL
Qty: 60 TABLET | Refills: 0 | Status: SHIPPED | OUTPATIENT
Start: 2023-01-18 | End: 2023-02-07 | Stop reason: SDUPTHER

## 2023-01-20 ENCOUNTER — PATIENT MESSAGE (OUTPATIENT)
Dept: FAMILY MEDICINE CLINIC | Facility: CLINIC | Age: 23
End: 2023-01-20
Payer: COMMERCIAL

## 2023-01-20 ENCOUNTER — HOSPITAL ENCOUNTER (OUTPATIENT)
Dept: ULTRASOUND IMAGING | Facility: HOSPITAL | Age: 23
Discharge: HOME | End: 2023-01-20
Admitting: FAMILY MEDICINE
Payer: COMMERCIAL

## 2023-01-20 DIAGNOSIS — R79.89 ELEVATED LIVER FUNCTION TESTS: ICD-10-CM

## 2023-01-20 DIAGNOSIS — R11.0 NAUSEA: ICD-10-CM

## 2023-01-20 PROCEDURE — 76705 ECHO EXAM OF ABDOMEN: CPT

## 2023-01-20 NOTE — TELEPHONE ENCOUNTER
From: Philippe Hartmann  To: Glenna Parsons  Sent: 1/20/2023 1:56 PM EST  Subject: Question regarding US LIVER ABDOMEN LIMITED    I’ve been feeling pretty normal and have recovered from the mono. None of my symptoms are persisting. I have been super hungry lately but it could just be because I’ve been working a lot. Would I be able to resume creatine and/or preworkout sometime soon? I’m consistently drinking about a gallon and a half of water a day and plan on only taking half the serving size of pre workout when okayed to resume.   Thanks!

## 2023-01-24 ENCOUNTER — OFFICE VISIT (OUTPATIENT)
Dept: FAMILY MEDICINE CLINIC | Facility: CLINIC | Age: 23
End: 2023-01-24
Payer: COMMERCIAL

## 2023-01-24 VITALS
HEART RATE: 89 BPM | WEIGHT: 171.4 LBS | DIASTOLIC BLOOD PRESSURE: 68 MMHG | TEMPERATURE: 98.2 F | OXYGEN SATURATION: 97 % | BODY MASS INDEX: 24 KG/M2 | SYSTOLIC BLOOD PRESSURE: 110 MMHG | HEIGHT: 71 IN

## 2023-01-24 DIAGNOSIS — B27.00 INFECTIOUS MONONUCLEOSIS DUE TO EPSTEIN-BARR VIRUS (EBV): Primary | ICD-10-CM

## 2023-01-24 DIAGNOSIS — R79.89 ELEVATED LIVER FUNCTION TESTS: ICD-10-CM

## 2023-01-24 PROCEDURE — 99213 OFFICE O/P EST LOW 20 MIN: CPT | Performed by: FAMILY MEDICINE

## 2023-01-24 NOTE — PROGRESS NOTES
"Chief Complaint  Follow-up    Subjective        Philippe Hartmann presents to [facility]  History of Present Illness  Patient is here to follow up on Mono and elevated liver function tests.  He is feeling much better and back to normal now.  He has not restarted creatine or pre-workout protein drinks with his work outs yet as advised.  He has been consuming extra water daily.  His blood sugars have improved and are stable at this time.      Objective   Vital Signs:  /68   Pulse 89   Temp 98.2 °F (36.8 °C)   Ht 180.3 cm (70.98\")   Wt 77.7 kg (171 lb 6.4 oz)   SpO2 97%   BMI 23.92 kg/m²   Estimated body mass index is 23.92 kg/m² as calculated from the following:    Height as of this encounter: 180.3 cm (70.98\").    Weight as of this encounter: 77.7 kg (171 lb 6.4 oz).       BMI is within normal parameters. No other follow-up for BMI required.      Physical Exam  Vitals and nursing note reviewed.   Constitutional:       Appearance: He is well-developed.   HENT:      Head: Normocephalic and atraumatic.   Eyes:      General: No scleral icterus.     Conjunctiva/sclera: Conjunctivae normal.   Cardiovascular:      Rate and Rhythm: Normal rate and regular rhythm.      Heart sounds: Normal heart sounds.   Pulmonary:      Effort: Pulmonary effort is normal.      Breath sounds: Normal breath sounds.   Abdominal:      General: Bowel sounds are normal. There is no distension.      Palpations: Abdomen is soft. There is no mass.      Tenderness: There is no abdominal tenderness. There is no right CVA tenderness, left CVA tenderness, guarding or rebound.      Hernia: No hernia is present.   Musculoskeletal:         General: Normal range of motion.      Cervical back: Normal range of motion and neck supple.   Skin:     General: Skin is warm and dry.      Capillary Refill: Capillary refill takes less than 2 seconds.      Findings: No rash.   Neurological:      Mental Status: He is alert and " oriented to person, place, and time.   Psychiatric:         Mood and Affect: Mood normal.         Behavior: Behavior normal.         Thought Content: Thought content normal.         Judgment: Judgment normal.        Result Review :  The following data was reviewed by: Glenna Parsons DO on 01/24/2023:  Common labs    Common Labs 12/27/22 12/27/22 12/27/22 1/9/23 1/9/23    1352 1501 1501 1048 1048   Glucose  106 (A)   115 (A)   BUN  14   21 (A)   Creatinine  1.18   1.02   Sodium  139   139   Potassium  4.3   4.7   Chloride  103   101   Calcium  9.2   9.7   Total Protein  6.6   6.8   Albumin  4.3   4.5   Total Bilirubin  0.6   0.5   Alkaline Phosphatase  320 (A)   313 (A)   AST (SGOT)  125 (A)   61 (A)   ALT (SGPT)  148 (A)   168 (A)   WBC   11.6 (A) 6.08    Hemoglobin   14.2 14.4    Hematocrit   40.9 41.0    Platelets   141 (A) 250    Hemoglobin A1C 7.1       (A) Abnormal value            TSH    TSH 12/27/22   TSH 1.470                        Assessment and Plan   Diagnoses and all orders for this visit:    1. Infectious mononucleosis due to Chary-Barr virus (EBV) (Primary)  -     Comprehensive Metabolic Panel    2. Elevated liver function tests  -     Comprehensive Metabolic Panel    Patient is here to follow-up on elevated liver function test following the diagnosis of acute mononucleosis.  Clinically the patient has improved and is back to baseline.  Diabetes is well controlled.  Repeat labs today to make sure liver function tests are normalizing.  Follow-up will based upon results.  Patient advised to return to the office if symptoms recur.  Keep diabetic follow-up with endocrinology as planned         Follow Up   Return if symptoms worsen or fail to improve.  Patient was given instructions and counseling regarding his condition or for health maintenance advice. Please see specific information pulled into the AVS if appropriate.

## 2023-01-25 LAB
ALBUMIN SERPL-MCNC: 4.3 G/DL (ref 3.5–5.2)
ALBUMIN/GLOB SERPL: 1.6 G/DL
ALP SERPL-CCNC: 157 U/L (ref 39–117)
ALT SERPL-CCNC: 35 U/L (ref 1–41)
AST SERPL-CCNC: 17 U/L (ref 1–40)
BILIRUB SERPL-MCNC: 0.4 MG/DL (ref 0–1.2)
BUN SERPL-MCNC: 18 MG/DL (ref 6–20)
BUN/CREAT SERPL: 22.8 (ref 7–25)
CALCIUM SERPL-MCNC: 9.5 MG/DL (ref 8.6–10.5)
CHLORIDE SERPL-SCNC: 103 MMOL/L (ref 98–107)
CO2 SERPL-SCNC: 28.1 MMOL/L (ref 22–29)
CREAT SERPL-MCNC: 0.79 MG/DL (ref 0.76–1.27)
EGFRCR SERPLBLD CKD-EPI 2021: 128.8 ML/MIN/1.73
GLOBULIN SER CALC-MCNC: 2.7 GM/DL
GLUCOSE SERPL-MCNC: 136 MG/DL (ref 65–99)
POTASSIUM SERPL-SCNC: 4.3 MMOL/L (ref 3.5–5.2)
PROT SERPL-MCNC: 7 G/DL (ref 6–8.5)
SODIUM SERPL-SCNC: 139 MMOL/L (ref 136–145)

## 2023-01-26 ENCOUNTER — TELEPHONE (OUTPATIENT)
Dept: FAMILY MEDICINE CLINIC | Facility: CLINIC | Age: 23
End: 2023-01-26
Payer: COMMERCIAL

## 2023-01-26 DIAGNOSIS — E10.9 TYPE 1 DIABETES MELLITUS WITHOUT COMPLICATION: Primary | ICD-10-CM

## 2023-01-26 RX ORDER — INSULIN LISPRO 100 [IU]/ML
5-10 INJECTION, SOLUTION INTRAVENOUS; SUBCUTANEOUS
Qty: 3 ML | Refills: 3 | Status: SHIPPED | OUTPATIENT
Start: 2023-01-26

## 2023-01-26 NOTE — TELEPHONE ENCOUNTER
LOV 1/24/23    [facility]    INS[facility] COVERS HUMALOG

## 2023-02-07 DIAGNOSIS — R07.9 CHEST PAIN, UNSPECIFIED TYPE: ICD-10-CM

## 2023-02-07 RX ORDER — FAMOTIDINE 20 MG/1
20 TABLET, FILM COATED ORAL 2 TIMES DAILY
Qty: 60 TABLET | Refills: 0 | Status: SHIPPED | OUTPATIENT
Start: 2023-02-07

## 2023-07-25 ENCOUNTER — OFFICE VISIT (OUTPATIENT)
Dept: FAMILY MEDICINE CLINIC | Facility: CLINIC | Age: 23
End: 2023-07-25
Payer: COMMERCIAL

## 2023-07-25 VITALS
SYSTOLIC BLOOD PRESSURE: 110 MMHG | WEIGHT: 175 LBS | TEMPERATURE: 97.8 F | OXYGEN SATURATION: 97 % | BODY MASS INDEX: 24.5 KG/M2 | HEIGHT: 71 IN | DIASTOLIC BLOOD PRESSURE: 68 MMHG | HEART RATE: 65 BPM

## 2023-07-25 DIAGNOSIS — S80.862A TICK BITE OF LEFT LOWER LEG, INITIAL ENCOUNTER: Primary | ICD-10-CM
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
## 2023-07-25 PROCEDURE — 99213 OFFICE O/P EST LOW 20 MIN: CPT | Performed by: FAMILY MEDICINE

## 2023-07-25 RX ORDER — DOXYCYCLINE HYCLATE 100 MG/1
200 CAPSULE ORAL DAILY
Qty: 2 CAPSULE | Refills: 0 | Status: SHIPPED | OUTPATIENT
Start: 2023-07-25

## 2023-07-25 NOTE — PROGRESS NOTES
Chief Complaint  Insect Bite (Tick on Left leg/Friday)    Subjective         Philippe Hartmann presents to [facility]  History of Present Illness    23-year-old male presents for tick bite evaluation.  Had tick bites few days ago, was attached for less than 24 hours and was not engorged.  Patient attempted to remove the tick, he pulled the tick sideways and did not pull straight up.  Patient started developing erythematous/purplish rash around the tick bite location associated with itching.  Denies tenderness, pain, fevers or chills.    Objective     Review of Systems     Past Medical History:   Diagnosis Date    Acute pharyngitis     Acute upper respiratory infection     Allergic rhinitis     Anxiety     Asthma     Atopic dermatitis     Diabetes mellitus     Fever     Impingement syndrome of left shoulder     Mono exposure     Shoulder disorder     Sore throat     Sore throat     Viral bronchitis         Current Outpatient Medications:     BD Pen Needle Martha 2nd Gen 32G X 4 MM misc, USE TO INJECT INSULIN FOUR TIMES DAILY, Disp: 200 each, Rfl: 0    Blood Glucose Monitoring Suppl (Accu-Chek Linsey Plus) w/Device kit, TEST THREE TIMES DAILY, Disp: 1 kit, Rfl: 0    Continuous Blood Gluc  (Dexcom G6 ) device, 1 Units Continuous., Disp: 1 each, Rfl: 2    Continuous Blood Gluc Sensor (Dexcom G6 Sensor), USE EVERY 10 DAYS, Disp: , Rfl:     Continuous Blood Gluc Transmit (Dexcom G6 Transmitter) misc, 1 Units Take As Directed., Disp: 1 each, Rfl: 0    famotidine (PEPCID) 20 MG tablet, Take 1 tablet by mouth 2 (Two) Times a Day., Disp: 60 tablet, Rfl: 0    glucose blood (Accu-Chek Linsey Plus) test strip, TEST THREE TIMES DAILY, Disp: 100 each, Rfl: 12    insulin degludec (TRESIBA FLEXTOUCH) 100 UNIT/ML solution pen-injector injection, Inject 10 Units under the skin into the appropriate area as directed Daily., Disp: , Rfl:     Insulin Lispro, 1 Unit Dial, (HumaLOG KwikPen) 100 UNIT/ML  "solution pen-injector, Inject 5-10 Units under the skin into the appropriate area as directed 3 (Three) Times a Day With Meals., Disp: 3 mL, Rfl: 3    Insulin Pen Needle (BD Pen Needle Martha U/F) 32G X 4 MM misc, Use four times daily, Disp: 200 each, Rfl: 2    Lancets (FREESTYLE) lancets, Use to test blood sugar three times daily, Disp: 90 each, Rfl: 12    NovoLOG FlexPen 100 UNIT/ML solution pen-injector sc pen, Inject 5-10 Units under the skin into the appropriate area as directed 3 (Three) Times a Day With Meals., Disp: 3 mL, Rfl: 1    NovoPen Echo device, , Disp: , Rfl:     nystatin (MYCOSTATIN) 356168 UNIT/GM ointment, Apply 1 application topically to the appropriate area as directed 2 (Two) Times a Day., Disp: 30 g, Rfl: 0    doxycycline (VIBRAMYCIN) 100 MG capsule, Take 2 capsules by mouth Daily., Disp: 2 capsule, Rfl: 0   Social History     Socioeconomic History    Marital status: Single    Highest education level: High school graduate   Tobacco Use    Smoking status: Some Days     Types: Electronic Cigarette    Smokeless tobacco: Never   Substance and Sexual Activity    Alcohol use: Yes    Drug use: Yes     Frequency: 0.5 times per week     Types: Marijuana, Psilocybin    Sexual activity: Yes     Partners: Female     Birth control/protection: Condom, I.U.D.      Vital Signs:   /68 (BP Location: Left arm, Patient Position: Sitting, Cuff Size: Adult)   Pulse 65   Temp 97.8 °F (36.6 °C) (Temporal)   Ht 180.3 cm (71\")   Wt 79.4 kg (175 lb)   SpO2 97%   BMI 24.41 kg/m²       Physical Exam  Constitutional:       Appearance: Normal appearance. He is not ill-appearing.   Neurological:      Mental Status: He is alert.      Comments: Red/purple about 2 cm in diameter purple/erythematous macule surrounding tick bite location.  The tick bite including wound has red granulomatous tissue, no remaining tick parts attached appreciated.        Result Review :                 Assessment and Plan    Diagnoses and " all orders for this visit:    1. Tick bite of left lower leg, initial encounter (Primary)  -     doxycycline (VIBRAMYCIN) 100 MG capsule; Take 2 capsules by mouth Daily.  Dispense: 2 capsule; Refill: 0      Tick bite attached for less than 24 hours, was not engorged but was not removed in a proper methods, also starting to develop rash and itching in the bite location.  No remaining tick parts attached, likely this is a local allergic reaction    I will give a prophylactic doxycycline dose due to history of and a proper manipulation of the tick  Advised patient to monitor and watch for change in the rash size and shape  Informed patient with symptoms of Lyme disease, bull's-eye rash, fatigue, joint pain and to call back if he developed any.  Advised patient to apply topical antibiotic cream and take Zyrtec      Follow Up   No follow-ups on file.  Patient was given instructions and counseling regarding his condition or for health maintenance advice. Please see specific information pulled into the AVS if appropriate.

## 2024-02-23 ENCOUNTER — TELEPHONE (OUTPATIENT)
Dept: FAMILY MEDICINE CLINIC | Facility: CLINIC | Age: 24
End: 2024-02-23
Payer: COMMERCIAL

## 2024-03-05 ENCOUNTER — TELEPHONE (OUTPATIENT)
Dept: FAMILY MEDICINE CLINIC | Facility: CLINIC | Age: 24
End: 2024-03-05
Payer: COMMERCIAL

## 2024-04-17 ENCOUNTER — PATIENT MESSAGE (OUTPATIENT)
Dept: FAMILY MEDICINE CLINIC | Facility: CLINIC | Age: 24
End: 2024-04-17
Payer: COMMERCIAL

## 2024-04-18 NOTE — TELEPHONE ENCOUNTER
From: Philippe Hartmann  To: Glenna Parsons  Sent: 4/17/2024 7:22 PM EDT  Subject: Shoulder injury    Hey!   My new insurance said they cover you all again so here I am with new problems!     I just hurt my shoulder at the gym dumbbell shoulder pressing. I was pressing 2 100lb dumbbells and should’ve had a  but I didn’t and when I neared failure the left one fell back and bent my arm behind me. I felt something crack/tear in my rear shoulder/back. My whole arm immediately went somewhat numb. I can still move it but as time goes on it hurts worse and moves less. Any recommendations on what to do/where to go?